# Patient Record
Sex: FEMALE | Race: WHITE | HISPANIC OR LATINO | Employment: UNEMPLOYED | URBAN - METROPOLITAN AREA
[De-identification: names, ages, dates, MRNs, and addresses within clinical notes are randomized per-mention and may not be internally consistent; named-entity substitution may affect disease eponyms.]

---

## 2018-06-29 DIAGNOSIS — K80.20 GALLBLADDER CALCULUS WITHOUT CHOLECYSTITIS AND NO OBSTRUCTION: Primary | ICD-10-CM

## 2018-08-16 ENCOUNTER — OFFICE VISIT (OUTPATIENT)
Dept: SURGERY | Facility: CLINIC | Age: 48
End: 2018-08-16
Payer: MEDICAID

## 2018-08-16 VITALS
DIASTOLIC BLOOD PRESSURE: 78 MMHG | BODY MASS INDEX: 33.65 KG/M2 | OXYGEN SATURATION: 98 % | WEIGHT: 182.88 LBS | HEIGHT: 62 IN | SYSTOLIC BLOOD PRESSURE: 102 MMHG | HEART RATE: 77 BPM | RESPIRATION RATE: 16 BRPM

## 2018-08-16 DIAGNOSIS — E78.00 HYPERCHOLESTEREMIA: ICD-10-CM

## 2018-08-16 DIAGNOSIS — Z78.9 USES SPANISH AS PRIMARY SPOKEN LANGUAGE: ICD-10-CM

## 2018-08-16 DIAGNOSIS — E66.9 OBESITY (BMI 30.0-34.9): ICD-10-CM

## 2018-08-16 DIAGNOSIS — K80.50 BILIARY COLIC: Primary | ICD-10-CM

## 2018-08-16 PROCEDURE — 99999 PR PBB SHADOW E&M-EST. PATIENT-LVL III: CPT | Mod: PBBFAC,,, | Performed by: SURGERY

## 2018-08-16 PROCEDURE — 99213 OFFICE O/P EST LOW 20 MIN: CPT | Mod: PBBFAC,PN | Performed by: SURGERY

## 2018-08-16 PROCEDURE — 99204 OFFICE O/P NEW MOD 45 MIN: CPT | Mod: S$PBB,,, | Performed by: SURGERY

## 2018-08-16 RX ORDER — LEVOTHYROXINE SODIUM 50 UG/1
TABLET ORAL
COMMUNITY
Start: 2018-08-04

## 2018-08-16 RX ORDER — ATORVASTATIN CALCIUM 20 MG/1
TABLET, FILM COATED ORAL
COMMUNITY
Start: 2018-08-04

## 2018-08-16 NOTE — LETTER
August 19, 2018      Davide Del Rosario NP  [address]  [address]  [facility] [address]           [place] - General Surgery  [address]  [facility] [address]  Phone: [phone]  Fax: [phone]          Patient: Bharti Fofana   MR Number: [id number]   YOB: 1970   Date of Visit: 8/16/2018       Dear Davide Del Rosario:    Thank you for referring Bharti Fofana to me for evaluation. Attached you will find relevant portions of my assessment and plan of care.    If you have questions, please do not hesitate to call me. I look forward to following Bharti Fofana along with you.    Sincerely,    Estela Montero,     Enclosure  CC:  No Recipients    If you would like to receive this communication electronically, please contact [email or web address] or [phone] to request more information on [facility] Link access.    For providers and/or their staff who would like to refer a patient to [facility], please contact us through our one-stop-shop provider referral line, [facility], at [phone].    If you feel you have received this communication in error or would no longer like to receive these types of communications, please e-mail [email or web address]

## 2018-08-16 NOTE — PROGRESS NOTES
"Subjective:      Patient ID: Bharti Fofana is a 48 y.o. female.    Chief Complaint: Gall Bladder Problem  Greenlandic speaking female p/w signif other for eval of RUQ pain, intermittent for 3yr+, usually occurs after fatty food/meat/coffee. Since she is avoiding those things, she feels better. No n/v. No f/c. 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 No other c/o.    Past Medical History:   Diagnosis Date    Hyperlipidemia     Hypothyroidism      Past Surgical History:   Procedure Laterality Date    HYSTERECTOMY       History reviewed. No pertinent family history.  Social History     Socioeconomic History    Marital status:      Spouse name: None    Number of children: None    Years of education: None    Highest education level: None   Social Needs    Financial resource strain: None    Food insecurity - worry: None    Food insecurity - inability: None    Transportation needs - medical: None    Transportation needs - non-medical: None   Occupational History    None   Tobacco Use    Smoking status: Never Smoker    Smokeless tobacco: Never Used   Substance and Sexual Activity    Alcohol use: No     Frequency: Never    Drug use: No    Sexual activity: None   Other Topics Concern    None   Social History Narrative    None       Current Outpatient Medications   Medication Sig Dispense Refill    atorvastatin (LIPITOR) 20 MG tablet       levothyroxine (SYNTHROID) 50 MCG tablet        No current facility-administered medications for this visit.      Review of patient's allergies indicates:  No Known Allergies    ROS:  All systems were reviewed and are negative, except that mentioned in the HPI.    Objective:     Vitals:    08/16/18 1407   BP: 102/78   BP Location: Left arm   Patient Position: Sitting   BP Method: Medium (Manual)   Pulse: 77   Resp: 16   SpO2: 98%   Weight: 83 kg (182 lb 14.4 oz)   Height: 5' 2" (1.575 m)     Physical Exam   Constitutional: She is oriented to person, " "place, and time. She appears well-developed and well-nourished. No distress.   HENT:   Head: Normocephalic and atraumatic.   Eyes: EOM are normal. Pupils are equal, round, and reactive to light. No scleral icterus.   Neck: Normal range of motion. No JVD present.   Cardiovascular: Normal rate and regular rhythm.   Pulmonary/Chest: Effort normal and breath sounds normal. No respiratory distress.   Abdominal: Soft. Bowel sounds are normal. She exhibits no distension. There is no rebound and no guarding.   Musculoskeletal: Normal range of motion.   Neurological: She is alert and oriented to person, place, and time. No cranial nerve deficit.   Skin: Skin is warm and dry. She is not diaphoretic.   Psychiatric: She has a normal mood and affect.       Lab Review:   Outside labs from 6/28/18 scanned into media file.  HCV neg  HBV neg  HIV neg  Vit D low  Hgb A1c - 5.2  Amylase 51  Lipase 21  TFTs within normal  Tot chol 198  CMP normal  CBC normal    Diagnostics Review:  DIS U/S report 7/19/18 states:  Liver unremarkable, 1.4cm mobile gallstone, "some gallbladder wall thickening," no biliary dilatation, normal CBD    Assessment:     1. Biliary colic    2. Obesity (BMI 30.0-34.9)    3. Hypercholesteremia    4. Uses English as primary spoken language      Plan:   The pathology of the patient's disease was discussed: biliary colic. Written handouts were explained and given to the patient.  Elective lap roman was recommended. The surgical procedure, risks, postop recovery and consent were discussed. The patient prefers to continue low fat diet for now and f/u in October. Signs and symptoms of worsening disease was discussed.  The patient will call or go to ER should those symptoms develop.  "

## 2018-08-27 ENCOUNTER — OFFICE VISIT (OUTPATIENT)
Dept: OBSTETRICS AND GYNECOLOGY | Facility: CLINIC | Age: 48
End: 2018-08-27
Payer: MEDICAID

## 2018-08-27 ENCOUNTER — LAB VISIT (OUTPATIENT)
Dept: LAB | Facility: HOSPITAL | Age: 48
End: 2018-08-27
Attending: OBSTETRICS & GYNECOLOGY
Payer: MEDICAID

## 2018-08-27 VITALS
DIASTOLIC BLOOD PRESSURE: 78 MMHG | WEIGHT: 182.56 LBS | BODY MASS INDEX: 33.6 KG/M2 | HEART RATE: 72 BPM | SYSTOLIC BLOOD PRESSURE: 114 MMHG | HEIGHT: 62 IN

## 2018-08-27 DIAGNOSIS — Z01.419 ENCOUNTER FOR ANNUAL ROUTINE GYNECOLOGICAL EXAMINATION: Primary | ICD-10-CM

## 2018-08-27 DIAGNOSIS — Z12.4 PAP SMEAR FOR CERVICAL CANCER SCREENING: ICD-10-CM

## 2018-08-27 DIAGNOSIS — N93.9 ABNORMAL UTERINE BLEEDING (AUB): ICD-10-CM

## 2018-08-27 LAB
BASOPHILS # BLD AUTO: 0.04 K/UL
BASOPHILS NFR BLD: 0.7 %
DIFFERENTIAL METHOD: ABNORMAL
EOSINOPHIL # BLD AUTO: 0.3 K/UL
EOSINOPHIL NFR BLD: 5.8 %
ERYTHROCYTE [DISTWIDTH] IN BLOOD BY AUTOMATED COUNT: 13.9 %
HCT VFR BLD AUTO: 39.2 %
HGB BLD-MCNC: 12.5 G/DL
LYMPHOCYTES # BLD AUTO: 2 K/UL
LYMPHOCYTES NFR BLD: 36.8 %
MCH RBC QN AUTO: 25.6 PG
MCHC RBC AUTO-ENTMCNC: 31.9 G/DL
MCV RBC AUTO: 80 FL
MONOCYTES # BLD AUTO: 0.6 K/UL
MONOCYTES NFR BLD: 10 %
NEUTROPHILS # BLD AUTO: 2.6 K/UL
NEUTROPHILS NFR BLD: 46.5 %
PLATELET # BLD AUTO: 269 K/UL
PMV BLD AUTO: 10.9 FL
PROLACTIN SERPL IA-MCNC: 22 NG/ML
RBC # BLD AUTO: 4.89 M/UL
TSH SERPL DL<=0.005 MIU/L-ACNC: 3.79 UIU/ML
WBC # BLD AUTO: 5.51 K/UL

## 2018-08-27 PROCEDURE — 99213 OFFICE O/P EST LOW 20 MIN: CPT | Mod: PBBFAC,PO | Performed by: OBSTETRICS & GYNECOLOGY

## 2018-08-27 PROCEDURE — 99386 PREV VISIT NEW AGE 40-64: CPT | Mod: S$PBB,,, | Performed by: OBSTETRICS & GYNECOLOGY

## 2018-08-27 PROCEDURE — 85025 COMPLETE CBC W/AUTO DIFF WBC: CPT

## 2018-08-27 PROCEDURE — 36415 COLL VENOUS BLD VENIPUNCTURE: CPT

## 2018-08-27 PROCEDURE — 84443 ASSAY THYROID STIM HORMONE: CPT

## 2018-08-27 PROCEDURE — 99999 PR PBB SHADOW E&M-EST. PATIENT-LVL III: CPT | Mod: PBBFAC,,, | Performed by: OBSTETRICS & GYNECOLOGY

## 2018-08-27 PROCEDURE — 84146 ASSAY OF PROLACTIN: CPT

## 2018-08-27 PROCEDURE — 88175 CYTOPATH C/V AUTO FLUID REDO: CPT

## 2018-08-27 PROCEDURE — 87624 HPV HI-RISK TYP POOLED RSLT: CPT

## 2018-08-27 NOTE — PROGRESS NOTES
Chief Complaint   Patient presents with    Metrorrhagia    Ovarian Cyst       HISTORY OF PRESENT ILLNESS:   Bharti Fofana is a 48 y.o. female  who presents for well woman exam.  Patient's last menstrual period was 2018.. She reports she had normal cycles until may when she had a 17 day cycle then had normal 8 days cycles but were painful and now she hasn't had one since .    Declines STD testing.      Past Medical History:   Diagnosis Date    Abnormal Pap smear of cervix     Hyperlipidemia     Hypothyroidism         Past Surgical History:   Procedure Laterality Date    TUBAL LIGATION         Social History     Socioeconomic History    Marital status:      Spouse name: Not on file    Number of children: Not on file    Years of education: Not on file    Highest education level: Not on file   Social Needs    Financial resource strain: Not on file    Food insecurity - worry: Not on file    Food insecurity - inability: Not on file    Transportation needs - medical: Not on file    Transportation needs - non-medical: Not on file   Occupational History    Not on file   Tobacco Use    Smoking status: Never Smoker    Smokeless tobacco: Never Used   Substance and Sexual Activity    Alcohol use: No     Frequency: Never    Drug use: No    Sexual activity: Not on file   Other Topics Concern    Not on file   Social History Narrative    Not on file       History reviewed. No pertinent family history.    OB History    Para Term  AB Living   5 4 4   1 4   SAB TAB Ectopic Multiple Live Births           4      # Outcome Date GA Lbr Isidro/2nd Weight Sex Delivery Anes PTL Lv   5 Term 08/10/00    M Vag-Spont   ROSEMARY   4 Term 99    M Vag-Spont   ROSEMARY   3 Term 93    F Vag-Spont   ROSEMARY   2 Term 91    M Vag-Spont   ROSEMARY   1 AB                   COMPREHENSIVE GYN HISTORY:  PAP History: had abn pap with colpo about 17 years ago but has been normal since  Infection History:  "Denies STDs. Denies PID.  Benign History: Denies uterine fibroids. had ovarian cyst but unsure which side. Denies endometriosis Denies other conditions.  Cancer History: Denies cervical cancer. Denies uterine cancer or hyperplasia. Denies ovarian cancer. Denies vulvar cancer or pre-cancer. Denies vaginal cancer or pre-cancer. Denies breast cancer. Denies colon cancer.  Cycle: 12/mon/8d    ROS:  GENERAL: Denies weight gain or weight loss. Feeling well overall.   SKIN: Denies rash or lesions.   HEAD: Denies headache.   NODES: Denies enlarged lymph nodes.   CHEST: Denies shortness of breath.   ABDOMEN: No abdominal pain, constipation, diarrhea, nausea, vomiting or rectal bleeding.   URINARY: No frequency, dysuria, hematuria, or burning on urination.  REPRODUCTIVE: See HPI.   BREASTS: The patient denies pain, lumps, or nipple discharge.       /78   Pulse 72   Ht 5' 2" (1.575 m)   Wt 82.8 kg (182 lb 8.7 oz)   LMP 07/01/2018   BMI 33.39 kg/m²     APPEARANCE: Well nourished, well developed, in no acute distress.  NECK: Neck symmetric without  thyromegaly.  NODES: No inguinal, cervical lymph node enlargement.  CHEST: Lungs clear to auscultation.  HEART: Regular rate and rhythm, no murmurs, rubs or gallops.  ABDOMEN: Soft. No tenderness or masses. No hernias. No hepatosplenomegaly.  BREASTS: Symmetrical, no skin changes or visible lesions. No palpable masses, nipple discharge or adenopathy bilaterally.  PELVIC:   VULVA: No lesions. Normal female genitalia.  URETHRAL MEATUS: Normal size and location, no lesions, no prolapse.  URETHRA: No masses, tenderness, prolapse or scarring.  VAGINA: Moist and well rugated, no discharge, no significant cystocele or rectocele.  CERVIX: No lesions and discharge.  UTERUS: mildly enlarged size, regular shape, mobile, non-tender, bladder base nontender.  ADNEXA: fullness on right side, no tenderness.  PERINEUM: Normal, mo masses        1. Abnormal uterine bleeding (AUB)        A/P " 1. Routine gyn annual exam. s/p normal breast exam and MMG ordered.  Pap with HPV cotesting ordered. STD testing: GC/CT/trich, syphilis, HBV/HCV and HIV declined.   2. AUB with get Cbc, TSH, prolactin, US and will do EMB afterwards    F/u in 1 yr or PRN

## 2018-08-27 NOTE — LETTER
August 27, 2018      Davide Del Rosario NP  [address]  [address]  [facility] [place] [address]           [place] - OB/GYN  [address], [address]  [address] [facility]  [place] [address]  Phone: [phone]          Patient: Bharti Fofana   MR Number: [id number]   YOB: 1970   Date of Visit: 8/27/2018       Dear Davide Del Rosario:    Thank you for referring Bharti Fofana to me for evaluation. Attached you will find relevant portions of my assessment and plan of care.    If you have questions, please do not hesitate to call me. I look forward to following Bharti Fofana along with you.    Sincerely,    Cailin Omalley MD    Enclosure  CC:  No Recipients    If you would like to receive this communication electronically, please contact [email or web address] or [phone] to request more information on [email or web address] Link access.    For providers and/or their staff who would like to refer a patient to [facility], please contact us through our one-stop-shop provider referral line, [facility] , at [phone].    If you feel you have received this communication in error or would no longer like to receive these types of communications, please e-mail [email or web address]

## 2018-09-05 ENCOUNTER — TELEPHONE (OUTPATIENT)
Dept: OBSTETRICS AND GYNECOLOGY | Facility: CLINIC | Age: 48
End: 2018-09-05

## 2018-09-05 LAB
HPV HR 12 DNA CVX QL NAA+PROBE: NEGATIVE
HPV16 AG SPEC QL: NEGATIVE
HPV18 DNA SPEC QL NAA+PROBE: NEGATIVE

## 2018-09-05 NOTE — PROGRESS NOTES
Please send patient pap smear letter or call. Her pap was negative and negative for the HPV virus so since it has been many  years since she had abnormal pap smear the new recommendations are that she will not need another one for 5 years but we will still continue to see her for annuals. thanks.

## 2018-09-05 NOTE — TELEPHONE ENCOUNTER
Called and informed patient of results. Patient agreed and verbalized understanding.    Cailin Omalley MD sent to [facility] Staff             Please send patient pap smear letter or call. Her pap was negative and negative for the HPV virus so since it has been many  years since she had abnormal pap smear the new recommendations are that she will not need another one for 5 years but we will still continue to see her for annuals. thanks.

## 2018-12-25 ENCOUNTER — HOSPITAL ENCOUNTER (OUTPATIENT)
Facility: HOSPITAL | Age: 48
LOS: 1 days | Discharge: HOME | End: 2018-12-26
Attending: STUDENT IN AN ORGANIZED HEALTH CARE EDUCATION/TRAINING PROGRAM | Admitting: STUDENT IN AN ORGANIZED HEALTH CARE EDUCATION/TRAINING PROGRAM
Payer: MEDICAID

## 2018-12-25 DIAGNOSIS — K80.50 BILIARY COLIC: ICD-10-CM

## 2018-12-25 DIAGNOSIS — B37.31 VAGINAL CANDIDIASIS: ICD-10-CM

## 2018-12-25 DIAGNOSIS — K82.1 ACUTE HYDROPS OF GALLBLADDER: Primary | ICD-10-CM

## 2018-12-25 DIAGNOSIS — R10.11 RIGHT UPPER QUADRANT ABDOMINAL PAIN: ICD-10-CM

## 2018-12-25 DIAGNOSIS — K80.50 BILIARY COLIC SYMPTOM: ICD-10-CM

## 2018-12-25 DIAGNOSIS — Z01.818 PRE-OP EXAM: ICD-10-CM

## 2018-12-25 PROBLEM — E78.5 HYPERLIPIDEMIA: Status: ACTIVE | Noted: 2018-12-25

## 2018-12-25 PROBLEM — E03.9 HYPOTHYROIDISM: Status: ACTIVE | Noted: 2018-12-25

## 2018-12-25 PROCEDURE — 93005 ELECTROCARDIOGRAM TRACING: CPT

## 2018-12-25 PROCEDURE — 93010 ELECTROCARDIOGRAM REPORT: CPT | Mod: ,,, | Performed by: STUDENT IN AN ORGANIZED HEALTH CARE EDUCATION/TRAINING PROGRAM

## 2018-12-25 PROCEDURE — 99219 PR INITIAL OBSERVATION CARE,LEVL II: ICD-10-PCS | Mod: ,,, | Performed by: STUDENT IN AN ORGANIZED HEALTH CARE EDUCATION/TRAINING PROGRAM

## 2018-12-25 PROCEDURE — G0378 HOSPITAL OBSERVATION PER HR: HCPCS

## 2018-12-25 PROCEDURE — 93010 EKG 12-LEAD: ICD-10-PCS | Mod: ,,, | Performed by: STUDENT IN AN ORGANIZED HEALTH CARE EDUCATION/TRAINING PROGRAM

## 2018-12-25 PROCEDURE — 99219 PR INITIAL OBSERVATION CARE,LEVL II: CPT | Mod: ,,, | Performed by: STUDENT IN AN ORGANIZED HEALTH CARE EDUCATION/TRAINING PROGRAM

## 2018-12-25 PROCEDURE — 25000003 PHARM REV CODE 250: Performed by: STUDENT IN AN ORGANIZED HEALTH CARE EDUCATION/TRAINING PROGRAM

## 2018-12-25 RX ORDER — ONDANSETRON 8 MG/1
8 TABLET, ORALLY DISINTEGRATING ORAL EVERY 8 HOURS PRN
Status: DISCONTINUED | OUTPATIENT
Start: 2018-12-25 | End: 2018-12-26 | Stop reason: HOSPADM

## 2018-12-25 RX ORDER — LEVOTHYROXINE SODIUM 50 UG/1
50 TABLET ORAL
Status: DISCONTINUED | OUTPATIENT
Start: 2018-12-26 | End: 2018-12-26 | Stop reason: HOSPADM

## 2018-12-25 RX ORDER — HYDROCODONE BITARTRATE AND ACETAMINOPHEN 5; 325 MG/1; MG/1
1 TABLET ORAL EVERY 4 HOURS PRN
Status: DISCONTINUED | OUTPATIENT
Start: 2018-12-25 | End: 2018-12-26 | Stop reason: HOSPADM

## 2018-12-25 RX ORDER — RAMELTEON 8 MG/1
8 TABLET ORAL NIGHTLY PRN
Status: DISCONTINUED | OUTPATIENT
Start: 2018-12-25 | End: 2018-12-26 | Stop reason: HOSPADM

## 2018-12-25 RX ORDER — MICONAZOLE NITRATE 2 %
1 CREAM WITH APPLICATOR VAGINAL NIGHTLY
Status: DISCONTINUED | OUTPATIENT
Start: 2018-12-25 | End: 2018-12-26 | Stop reason: HOSPADM

## 2018-12-25 RX ORDER — LIDOCAINE HYDROCHLORIDE 10 MG/ML
1 INJECTION, SOLUTION EPIDURAL; INFILTRATION; INTRACAUDAL; PERINEURAL ONCE
Status: DISCONTINUED | OUTPATIENT
Start: 2018-12-25 | End: 2018-12-26 | Stop reason: HOSPADM

## 2018-12-25 RX ORDER — SODIUM CHLORIDE 0.9 % (FLUSH) 0.9 %
3 SYRINGE (ML) INJECTION
Status: DISCONTINUED | OUTPATIENT
Start: 2018-12-25 | End: 2018-12-26 | Stop reason: HOSPADM

## 2018-12-25 RX ORDER — ACETAMINOPHEN 325 MG/1
650 TABLET ORAL EVERY 8 HOURS PRN
Status: DISCONTINUED | OUTPATIENT
Start: 2018-12-25 | End: 2018-12-26 | Stop reason: HOSPADM

## 2018-12-25 RX ORDER — ATORVASTATIN CALCIUM 20 MG/1
20 TABLET, FILM COATED ORAL DAILY
Status: DISCONTINUED | OUTPATIENT
Start: 2018-12-25 | End: 2018-12-26 | Stop reason: HOSPADM

## 2018-12-25 RX ADMIN — ATORVASTATIN CALCIUM 20 MG: 20 TABLET, FILM COATED ORAL at 03:12

## 2018-12-25 RX ADMIN — MICONAZOLE NITRATE 1 APPLICATOR: 20 CREAM VAGINAL at 09:12

## 2018-12-25 NOTE — NURSING
Dr. Black at bedside. All questions answered. I translated for patient as she is Uzbek speaking only. Time for all questions allowed. Patient verbalizes understanding of clear liquid diet now til midnight tonight; npo after midnight for possible chlolecystectomy tomorrow. Patient expresses desire to have gallbladder out as her pain has been worst since she was last seen by surgeon and  her mother having   a    history of gallbladder cancer.

## 2018-12-25 NOTE — H&P
Patient ID: Bharti Fofana is a 48 y.o. female.    Chief Complaint: No chief complaint on file.      HPI:  HPI   48F with RUQ pain that began yestrday, became severe. Pain has been constant so she presented to [facility]. [facility]. She has had multiple pain episodes like this before. Pain radiates to her back. She has seen dr graves a few months ago but did not want surgery as pain was not that bad. Pain seems to be worsening. Has known gallstone on US. Pain is worse after eating. Also complains of vaginal itching.    Review of Systems   Constitutional: Negative for fever.   HENT: Negative for trouble swallowing.    Respiratory: Negative for shortness of breath.    Cardiovascular: Negative for chest pain.   Gastrointestinal: Positive for abdominal pain. Negative for blood in stool, nausea and vomiting.   Genitourinary: Negative for dysuria.   Musculoskeletal: Negative for gait problem.   Skin: Negative for color change, rash and wound.   Allergic/Immunologic: Negative for immunocompromised state.   Neurological: Negative for weakness.   Hematological: Does not bruise/bleed easily.   Psychiatric/Behavioral: Negative for agitation.       Current Facility-Administered Medications   Medication Dose Route Frequency Provider Last Rate Last Dose    acetaminophen tablet 650 mg  650 mg Oral Q8H PRN Justin Black MD        atorvastatin tablet 20 mg  20 mg Oral Daily Justin Black MD        HYDROcodone-acetaminophen 5-325 mg per tablet 1 tablet  1 tablet Oral Q4H PRN Justin Black MD        [START ON 12/26/2018] levothyroxine tablet 50 mcg  50 mcg Oral Before breakfast Justin Black MD        lidocaine (PF) 10 mg/ml (1%) injection 10 mg  1 mL Other Once Justin Black MD        ondansetron disintegrating tablet 8 mg  8 mg Oral Q8H PRN Justin Black MD        promethazine (PHENERGAN) 6.25 mg in dextrose 5 % 50 mL IVPB  6.25 mg Intravenous Q6H PRN Justin Black MD         ramelteon tablet 8 mg  8 mg Oral Nightly PRN Justin Black MD        sodium chloride 0.9% flush 3 mL  3 mL Intravenous PRN Justin Black MD           Review of patient's allergies indicates:  No Known Allergies    Past Medical History:   Diagnosis Date    Abnormal Pap smear of cervix     Hyperlipidemia     Hypothyroidism        Past Surgical History:   Procedure Laterality Date    TUBAL LIGATION         No family history on file.    Social History     Socioeconomic History    Marital status:      Spouse name: Not on file    Number of children: Not on file    Years of education: Not on file    Highest education level: Not on file   Social Needs    Financial resource strain: Not on file    Food insecurity - worry: Not on file    Food insecurity - inability: Not on file    Transportation needs - medical: Not on file    Transportation needs - non-medical: Not on file   Occupational History    Not on file   Tobacco Use    Smoking status: Never Smoker    Smokeless tobacco: Never Used   Substance and Sexual Activity    Alcohol use: No     Frequency: Never    Drug use: No    Sexual activity: Not on file   Other Topics Concern    Not on file   Social History Narrative    Not on file       Vitals:    12/25/18 1403   BP: (!) 142/73   Pulse: 72   Resp: 18   Temp: 98.5 °F (36.9 °C)       Physical Exam   Constitutional: She is oriented to person, place, and time. She appears well-developed and well-nourished. No distress.   HENT:   Head: Normocephalic and atraumatic.   Eyes: No scleral icterus.   Cardiovascular: Normal rate.   Pulmonary/Chest: Effort normal. No stridor.   Abdominal: Soft. She exhibits no distension. There is no tenderness.   Lymphadenopathy:     She has no cervical adenopathy.   Neurological: She is alert and oriented to person, place, and time.   Skin: Skin is warm. No erythema.   Psychiatric: She has a normal mood and affect. Her behavior is normal.     LFTs  normal  WBC normal  US shows 1cm nonmobile gallstone in neck of gb. CBD 2mm      Assessment & Plan:   48F with biliary colic  TO OR for lap roman. Will try to go to surgery tomorrow  Clears ok for now. NPO after midnight  Hypothyroid - resume synthroid  HLD - resume statin  miconazole

## 2018-12-26 ENCOUNTER — ANESTHESIA (OUTPATIENT)
Dept: SURGERY | Facility: HOSPITAL | Age: 48
End: 2018-12-26
Payer: MEDICAID

## 2018-12-26 ENCOUNTER — ANESTHESIA EVENT (OUTPATIENT)
Dept: SURGERY | Facility: HOSPITAL | Age: 48
End: 2018-12-26
Payer: MEDICAID

## 2018-12-26 VITALS
TEMPERATURE: 98 F | SYSTOLIC BLOOD PRESSURE: 131 MMHG | OXYGEN SATURATION: 100 % | DIASTOLIC BLOOD PRESSURE: 72 MMHG | HEIGHT: 62 IN | RESPIRATION RATE: 20 BRPM | BODY MASS INDEX: 31.85 KG/M2 | HEART RATE: 88 BPM | WEIGHT: 173.06 LBS

## 2018-12-26 PROBLEM — K82.1 ACUTE HYDROPS OF GALLBLADDER: Status: ACTIVE | Noted: 2018-12-25

## 2018-12-26 PROCEDURE — 27201423 OPTIME MED/SURG SUP & DEVICES STERILE SUPPLY: Performed by: SURGERY

## 2018-12-26 PROCEDURE — 94761 N-INVAS EAR/PLS OXIMETRY MLT: CPT | Mod: 59

## 2018-12-26 PROCEDURE — 25000003 PHARM REV CODE 250: Performed by: ANESTHESIOLOGY

## 2018-12-26 PROCEDURE — 00790 ANES IPER UPR ABD NOS: CPT | Performed by: SURGERY

## 2018-12-26 PROCEDURE — 88304 TISSUE EXAM BY PATHOLOGIST: CPT | Performed by: PATHOLOGY

## 2018-12-26 PROCEDURE — 25000003 PHARM REV CODE 250: Performed by: SURGERY

## 2018-12-26 PROCEDURE — 25000003 PHARM REV CODE 250: Performed by: NURSE ANESTHETIST, CERTIFIED REGISTERED

## 2018-12-26 PROCEDURE — 47562 LAPAROSCOPIC CHOLECYSTECTOMY: CPT | Mod: ,,, | Performed by: SURGERY

## 2018-12-26 PROCEDURE — 36000708 HC OR TIME LEV III 1ST 15 MIN: Performed by: SURGERY

## 2018-12-26 PROCEDURE — 36000709 HC OR TIME LEV III EA ADD 15 MIN: Performed by: SURGERY

## 2018-12-26 PROCEDURE — 88304 TISSUE SPECIMEN TO PATHOLOGY - SURGERY: ICD-10-PCS | Mod: 26,,, | Performed by: PATHOLOGY

## 2018-12-26 PROCEDURE — 88304 TISSUE EXAM BY PATHOLOGIST: CPT | Mod: 26,,, | Performed by: PATHOLOGY

## 2018-12-26 PROCEDURE — 63600175 PHARM REV CODE 636 W HCPCS: Performed by: NURSE ANESTHETIST, CERTIFIED REGISTERED

## 2018-12-26 PROCEDURE — S0020 INJECTION, BUPIVICAINE HYDRO: HCPCS | Performed by: SURGERY

## 2018-12-26 PROCEDURE — 25000003 PHARM REV CODE 250: Performed by: STUDENT IN AN ORGANIZED HEALTH CARE EDUCATION/TRAINING PROGRAM

## 2018-12-26 PROCEDURE — 37000009 HC ANESTHESIA EA ADD 15 MINS: Performed by: SURGERY

## 2018-12-26 PROCEDURE — 71000033 HC RECOVERY, INTIAL HOUR: Performed by: SURGERY

## 2018-12-26 PROCEDURE — A4216 STERILE WATER/SALINE, 10 ML: HCPCS | Performed by: ANESTHESIOLOGY

## 2018-12-26 PROCEDURE — G0378 HOSPITAL OBSERVATION PER HR: HCPCS

## 2018-12-26 PROCEDURE — 47562 PR LAP,CHOLECYSTECTOMY: ICD-10-PCS | Mod: ,,, | Performed by: SURGERY

## 2018-12-26 PROCEDURE — 37000008 HC ANESTHESIA 1ST 15 MINUTES: Performed by: SURGERY

## 2018-12-26 RX ORDER — DEXAMETHASONE SODIUM PHOSPHATE 4 MG/ML
INJECTION, SOLUTION INTRA-ARTICULAR; INTRALESIONAL; INTRAMUSCULAR; INTRAVENOUS; SOFT TISSUE
Status: DISCONTINUED | OUTPATIENT
Start: 2018-12-26 | End: 2018-12-26

## 2018-12-26 RX ORDER — PROPOFOL 10 MG/ML
VIAL (ML) INTRAVENOUS
Status: DISCONTINUED | OUTPATIENT
Start: 2018-12-26 | End: 2018-12-26

## 2018-12-26 RX ORDER — ONDANSETRON HYDROCHLORIDE 2 MG/ML
INJECTION, SOLUTION INTRAMUSCULAR; INTRAVENOUS
Status: DISCONTINUED | OUTPATIENT
Start: 2018-12-26 | End: 2018-12-26

## 2018-12-26 RX ORDER — KETOROLAC TROMETHAMINE 30 MG/ML
INJECTION, SOLUTION INTRAMUSCULAR; INTRAVENOUS
Status: DISCONTINUED | OUTPATIENT
Start: 2018-12-26 | End: 2018-12-26

## 2018-12-26 RX ORDER — DIPHENHYDRAMINE HYDROCHLORIDE 50 MG/ML
25 INJECTION INTRAMUSCULAR; INTRAVENOUS EVERY 6 HOURS PRN
Status: DISCONTINUED | OUTPATIENT
Start: 2018-12-26 | End: 2018-12-26 | Stop reason: HOSPADM

## 2018-12-26 RX ORDER — PHENYLEPHRINE HYDROCHLORIDE 10 MG/ML
INJECTION INTRAVENOUS
Status: DISCONTINUED | OUTPATIENT
Start: 2018-12-26 | End: 2018-12-26

## 2018-12-26 RX ORDER — ROCURONIUM BROMIDE 10 MG/ML
INJECTION, SOLUTION INTRAVENOUS
Status: DISCONTINUED | OUTPATIENT
Start: 2018-12-26 | End: 2018-12-26

## 2018-12-26 RX ORDER — MIDAZOLAM HYDROCHLORIDE 1 MG/ML
INJECTION INTRAMUSCULAR; INTRAVENOUS
Status: DISCONTINUED | OUTPATIENT
Start: 2018-12-26 | End: 2018-12-26

## 2018-12-26 RX ORDER — SODIUM CHLORIDE 0.9 % (FLUSH) 0.9 %
3 SYRINGE (ML) INJECTION
Status: DISCONTINUED | OUTPATIENT
Start: 2018-12-26 | End: 2018-12-26 | Stop reason: HOSPADM

## 2018-12-26 RX ORDER — NEOSTIGMINE METHYLSULFATE 1 MG/ML
INJECTION, SOLUTION INTRAVENOUS
Status: DISCONTINUED | OUTPATIENT
Start: 2018-12-26 | End: 2018-12-26

## 2018-12-26 RX ORDER — ONDANSETRON 2 MG/ML
4 INJECTION INTRAMUSCULAR; INTRAVENOUS DAILY PRN
Status: DISCONTINUED | OUTPATIENT
Start: 2018-12-26 | End: 2018-12-26 | Stop reason: HOSPADM

## 2018-12-26 RX ORDER — GLYCOPYRROLATE 0.2 MG/ML
INJECTION INTRAMUSCULAR; INTRAVENOUS
Status: DISCONTINUED | OUTPATIENT
Start: 2018-12-26 | End: 2018-12-26

## 2018-12-26 RX ORDER — FENTANYL CITRATE 50 UG/ML
INJECTION, SOLUTION INTRAMUSCULAR; INTRAVENOUS
Status: DISCONTINUED | OUTPATIENT
Start: 2018-12-26 | End: 2018-12-26

## 2018-12-26 RX ORDER — HYDROMORPHONE HYDROCHLORIDE 2 MG/ML
0.2 INJECTION, SOLUTION INTRAMUSCULAR; INTRAVENOUS; SUBCUTANEOUS EVERY 5 MIN PRN
Status: ACTIVE | OUTPATIENT
Start: 2018-12-26 | End: 2018-12-26

## 2018-12-26 RX ORDER — HYDROMORPHONE HYDROCHLORIDE 2 MG/ML
0.5 INJECTION, SOLUTION INTRAMUSCULAR; INTRAVENOUS; SUBCUTANEOUS EVERY 5 MIN PRN
Status: ACTIVE | OUTPATIENT
Start: 2018-12-26 | End: 2018-12-26

## 2018-12-26 RX ORDER — CEFAZOLIN SODIUM 1 G/3ML
INJECTION, POWDER, FOR SOLUTION INTRAMUSCULAR; INTRAVENOUS
Status: DISCONTINUED | OUTPATIENT
Start: 2018-12-26 | End: 2018-12-26

## 2018-12-26 RX ORDER — BUPIVACAINE HYDROCHLORIDE 5 MG/ML
INJECTION, SOLUTION EPIDURAL; INTRACAUDAL
Status: DISCONTINUED | OUTPATIENT
Start: 2018-12-26 | End: 2018-12-26 | Stop reason: HOSPADM

## 2018-12-26 RX ORDER — SODIUM CHLORIDE, SODIUM LACTATE, POTASSIUM CHLORIDE, CALCIUM CHLORIDE 600; 310; 30; 20 MG/100ML; MG/100ML; MG/100ML; MG/100ML
INJECTION, SOLUTION INTRAVENOUS CONTINUOUS PRN
Status: DISCONTINUED | OUTPATIENT
Start: 2018-12-26 | End: 2018-12-26

## 2018-12-26 RX ORDER — MICONAZOLE NITRATE 2 %
1 CREAM WITH APPLICATOR VAGINAL NIGHTLY
Refills: 0 | COMMUNITY
Start: 2018-12-26 | End: 2019-01-03

## 2018-12-26 RX ORDER — SODIUM CHLORIDE 0.9 % (FLUSH) 0.9 %
3 SYRINGE (ML) INJECTION EVERY 8 HOURS
Status: DISCONTINUED | OUTPATIENT
Start: 2018-12-26 | End: 2018-12-26 | Stop reason: HOSPADM

## 2018-12-26 RX ORDER — LIDOCAINE HCL/PF 100 MG/5ML
SYRINGE (ML) INTRAVENOUS
Status: DISCONTINUED | OUTPATIENT
Start: 2018-12-26 | End: 2018-12-26

## 2018-12-26 RX ORDER — OXYCODONE AND ACETAMINOPHEN 5; 325 MG/1; MG/1
1 TABLET ORAL EVERY 6 HOURS PRN
Qty: 28 TABLET | Refills: 0 | Status: SHIPPED | OUTPATIENT
Start: 2018-12-26 | End: 2019-01-02

## 2018-12-26 RX ADMIN — DEXAMETHASONE SODIUM PHOSPHATE 8 MG: 4 INJECTION, SOLUTION INTRAMUSCULAR; INTRAVENOUS at 09:12

## 2018-12-26 RX ADMIN — LEVOTHYROXINE SODIUM 50 MCG: 50 TABLET ORAL at 06:12

## 2018-12-26 RX ADMIN — GLYCOPYRROLATE 0.4 MG: 0.2 INJECTION, SOLUTION INTRAMUSCULAR; INTRAVENOUS at 10:12

## 2018-12-26 RX ADMIN — NEOSTIGMINE METHYLSULFATE 2 MG: 1 INJECTION INTRAVENOUS at 10:12

## 2018-12-26 RX ADMIN — LIDOCAINE HYDROCHLORIDE 100 MG: 20 INJECTION, SOLUTION INTRAVENOUS at 09:12

## 2018-12-26 RX ADMIN — KETOROLAC TROMETHAMINE 30 MG: 30 INJECTION, SOLUTION INTRAMUSCULAR; INTRAVENOUS at 10:12

## 2018-12-26 RX ADMIN — SODIUM CHLORIDE, SODIUM LACTATE, POTASSIUM CHLORIDE, AND CALCIUM CHLORIDE: .6; .31; .03; .02 INJECTION, SOLUTION INTRAVENOUS at 10:12

## 2018-12-26 RX ADMIN — PHENYLEPHRINE HYDROCHLORIDE 150 MCG: 10 INJECTION INTRAVENOUS at 10:12

## 2018-12-26 RX ADMIN — MIDAZOLAM HYDROCHLORIDE 2 MG: 1 INJECTION, SOLUTION INTRAMUSCULAR; INTRAVENOUS at 09:12

## 2018-12-26 RX ADMIN — ROCURONIUM BROMIDE 40 MG: 10 INJECTION, SOLUTION INTRAVENOUS at 09:12

## 2018-12-26 RX ADMIN — PROPOFOL 150 MG: 10 INJECTION, EMULSION INTRAVENOUS at 09:12

## 2018-12-26 RX ADMIN — PHENYLEPHRINE HYDROCHLORIDE 150 MCG: 10 INJECTION INTRAVENOUS at 09:12

## 2018-12-26 RX ADMIN — SODIUM CHLORIDE, SODIUM LACTATE, POTASSIUM CHLORIDE, AND CALCIUM CHLORIDE: .6; .31; .03; .02 INJECTION, SOLUTION INTRAVENOUS at 09:12

## 2018-12-26 RX ADMIN — ONDANSETRON 8 MG: 2 INJECTION, SOLUTION INTRAMUSCULAR; INTRAVENOUS at 10:12

## 2018-12-26 RX ADMIN — FENTANYL CITRATE 150 MCG: 50 INJECTION, SOLUTION INTRAMUSCULAR; INTRAVENOUS at 09:12

## 2018-12-26 RX ADMIN — SODIUM CHLORIDE, PRESERVATIVE FREE 3 ML: 5 INJECTION INTRAVENOUS at 02:12

## 2018-12-26 RX ADMIN — CEFAZOLIN 2 G: 330 INJECTION, POWDER, FOR SOLUTION INTRAMUSCULAR; INTRAVENOUS at 09:12

## 2018-12-26 RX ADMIN — PHENYLEPHRINE HYDROCHLORIDE 100 MCG: 10 INJECTION INTRAVENOUS at 09:12

## 2018-12-26 RX ADMIN — ROCURONIUM BROMIDE 10 MG: 10 INJECTION, SOLUTION INTRAVENOUS at 10:12

## 2018-12-26 NOTE — PLAN OF CARE
Problem: Adult Inpatient Plan of Care  Goal: Plan of Care Review  Outcome: Ongoing (interventions implemented as appropriate)  Tolerating clear liquid diet  Aware for potential surgery in morning  Aware to be npo after midnight    Only reports mild epigastric discomfort to palpation  No nausea reported   Plan of care reviewed with pt and family

## 2018-12-26 NOTE — PLAN OF CARE
TN met with pt   This  put name on white board and explained blue discharge folder to patient. Discharge planning brochure and/or business card given to patient.  Patient verbalized understanding.  pt and  speak some English; son at bedside speaks English.    per pt and son - d/c instructions have been reviewed with pt in [place]       Future Appointments   Date Time Provider Department Center   1/8/2019  9:40 AM Kenneth Sahu Jr., MD [facility] [place]     d/c summ to be faxed to pt's pcp at [facility]     s/p lap cholecystectomy  12/26 12/26/18 0240   Discharge Assessment   Assessment Type Discharge Planning Assessment   Confirmed/corrected address and phone number on facesheet? Yes   Assessment information obtained from? Patient;Medical Record;Caregiver   Expected Length of Stay (days) 1   Communicated expected length of stay with patient/caregiver yes   Prior to hospitilization cognitive status: Alert/Oriented   Prior to hospitalization functional status: Independent   Current cognitive status: Alert/Oriented   Current Functional Status: Independent   Lives With child(julio), adult;spouse   Able to Return to Prior Arrangements yes   Is patient able to care for self after discharge? Yes   Who are your caregiver(s) and their phone number(s)? ( Oswaldo  [phone]; adult son, 15 yo son )   Patient's perception of discharge disposition home or selfcare   Readmission Within the Last 30 Days no previous admission in last 30 days   Patient currently being followed by outpatient case management? No   Patient currently receives any other outside agency services? No   Equipment Currently Used at Home none   Do you have any problems affording any of your prescribed medications? No  ([place] )   Is the patient taking medications as prescribed? yes   Does the patient have transportation home? Yes   Transportation Anticipated family or friend will provide   Does  the patient receive services at the [facility]? No   Discharge Plan A Home;Home with family   Patient/Family in Agreement with Plan yes

## 2018-12-26 NOTE — PLAN OF CARE
Problem: Adult Inpatient Plan of Care  Goal: Plan of Care Review  Outcome: Ongoing (interventions implemented as appropriate)  Pt vitals were maintained . Pt didn't complain of any pain all throughout the night, pt slept mostly. Pt maybe is schedule of surgery pending Dr orders. No other complaints. Will continue to monitor

## 2018-12-26 NOTE — PROGRESS NOTES
Follow-Up       Follow-up With  Details  Why  Contact Info   Kenneth Sahu Jr., MD  On 1/3/2019  2:20 pm   [address] [address]  [phone]

## 2018-12-26 NOTE — PROGRESS NOTES
Patient seen examined postoperatively.  She is in good spirits and indicates her pain is well controlled.  She is tolerating clear liquid diet.  Patient feels comfortable being discharged home today.  We reviewed postoperative instructions and all questions were answered.

## 2018-12-26 NOTE — TRANSFER OF CARE
"Anesthesia Transfer of Care Note    Patient: Bharti Fofana    Procedure(s) Performed: Procedure(s) (LRB):  CHOLECYSTECTOMY, LAPAROSCOPIC, WITH CHOLANGIOGRAM (N/A)    Patient location: PACU    Anesthesia Type: general    Transport from OR: Transported from OR on 6-10 L/min O2 by face mask with adequate spontaneous ventilation    Post pain: adequate analgesia    Post assessment: no apparent anesthetic complications    Post vital signs: stable    Level of consciousness: responds to stimulation and sedated    Nausea/Vomiting: no nausea/vomiting    Complications: none    Transfer of care protocol was followed      Last vitals:   Visit Vitals  /69 (Patient Position: Lying)   Pulse 75   Temp 36.7 °C (98.1 °F) (Oral)   Resp 20   Ht 5' 2" (1.575 m)   Wt 78.5 kg (173 lb 1 oz)   LMP 07/01/2018   SpO2 99%   Breastfeeding? No   BMI 31.65 kg/m²     "

## 2018-12-26 NOTE — PROGRESS NOTES
[facility]  PROGRESS NOTE    HPI: Bharti Fofana is a 48 y.o. female admitted for right upper quadrant pain with cholelithiasis.      VITALS:  Temp:  [97.7 °F (36.5 °C)-98.5 °F (36.9 °C)] 97.9 °F (36.6 °C)  Pulse:  [60-74] 74  Resp:  [18] 18  SpO2:  [99 %] 99 %  BP: (124-142)/(68-76) 124/68    PHYSICAL EXAM:  GEN:  No acute distress, alert and orient x3  HEENT:  Anicteric sclera  CV:  Regular rhythm  RESP:  Nonlabored breathing  ABD:  Mild tenderness palpation right upper quadrant, otherwise soft nontender  EXT:  No significant edema    LABS:  CBC:   Recent Labs   Lab 12/25/18  0531   WBC 6.65   RBC 4.71   HGB 12.0   HCT 37.6      MCV 80*   MCH 25.5*   MCHC 31.9*     CMP:   Recent Labs   Lab 12/25/18  0531   *   CALCIUM 8.6*   ALBUMIN 4.1   PROT 7.6      K 3.8   CO2 24      BUN 14   CREATININE 0.63   ALKPHOS 78   ALT 15   AST 16   BILITOT 0.3     Labs within the past 24 hours have been reviewed.    ASSESSMENT & PLAN:  48 y.o. female acute onset of right upper quadrant pain and gallstones seen on ultrasound  - plan for lap versus open cholecystectomy today  - consent obtained through the language line, patient queried for questions and all concerns addressed

## 2018-12-26 NOTE — OP NOTE
DATE OF PROCEDURE: 12/26/2018    PREOPERATIVE DIAGNOSIS:  Cholelithiasis    POSTOPERATIVE DIAGNOSIS:  Cholecystitis with hydrops gallbladder    PROCEDURE: Laparoscopic cholecystectomy    SURGEON: Kenneth Sahu M.D    ASSISTANT:  Hussein Arora MD (PGY II)    ANESTHESIA: General endotracheal    ESTIMATED BLOOD LOSS:  20 cc    SPECIMEN: Gallbladder    CONDITION: Stable    COMPLICATIONS: None    FINDINGS:   - gallbladder with moderate inflammation and adhesive disease  - drainage of gallbladder revealed clear fluid indicating hydrops  - stone impacted at infundibulum/duct junction    INDICATIONS: The patient is a 48 y.o. female who presented right upper quadrant pain suggestive of biliary etiology. The patient was counseled on their surgical options and desired surgical intervention. The risks of the procedure were described to the patient including pain, infection, bleeding, scarring, wound complications, injury to local structures such as bile duct, liver or intestine,warranting more extensive surgery, retained common bile duct stone or need for further intervention. The patient demonstrated understanding of these risks and a consent form was obtained.  The consent was obtained through translation service    PROCEDURE IN DETAIL: The patient was identified in the Preoperative Unit and taken back to the Operating Room and laid supine on the operating room table. IV antibiotics were administered and general anesthesia was induced without complication. The patient was then prepped and draped in the standard sterile fashion. A tmeout was performed in accordance with hospital protocol. After injecting local Marcaine 0.5%, a 1 cm incision was sharply made in the supraumbilical region. An Optiview trocar was used to gain access to the abdominal cavity and insufflation was initiated.  We achieved appropriate pneumoperitoneum.  There was some adhesive disease present at our entry site but no obvious injury to underlying  bowel.  The abdomen was then inspected. An 11 mm trocar was placed in the subxiphoid region after 1st injecting local anesthetic and then making a sharp incision through the skin. Two additional 5 mm trocars were placed in the right upper quadrant in a similar fashion. We then examined our umbilical Optiview entry site again found some adhesions of omentum present. These were taken down.  There is no obvious injury to the underlying bowel. The gallbladder was identified and found to be distended and inflamed with adhesions of omentum present. The adhesions were gently stripped off the gallbladder. The gallbladder fundus was grasped and retracted into the right upper quadrant and the infundibulum retracted laterally. The peritoneum over the infundibulum and cystic structures was then gently stripped until the cystic duct and artery were identified and the critical view was achieved.The cystic duct and artery were doubly clipped proximally and once distally and then divided. The gallbladder was then dissected off the gallbladder fossa using electrocautery. There is significant edema surrounding the gallbladder. Once this was completed, it was placed into an EndoCatch bag and was attempted to be removed through the subxiphoid port. However the bag tore the the gallbladder had to be removed with Ethel clamp.  We assured that the are was no residual pieces of plastic from the bag present in the incision or the abdomen. The wound was then copiously irrigated with sterile saline. The right upper quadrant was then irrigated and then suctioned. The dissection field was inspected for hemostasis, bile leak and to confirmed clips were in place. Additional local anesthetic was injected around the port sites under direct visualization.  The ports were removed and the abdomen desulfated.  The 0-Vicryl fascia stitch was placed at the subxiphoid incision then 3-0 Vicryls were placed interrupted fashion in the dermis. Additional  local anesthetic was injected and all the skin incisions were closed using 4-0 Vicryl subcuticular stitch. Dermabond was then applied. The patient was awakened from general anesthesia without complication and returned to the Postoperative Recovery Unit in stable condition. At the end of the case, sponge, instrument and needle counts were correct on 2 occasions. I was present and scrubbed throughout the entirety of the case.

## 2018-12-26 NOTE — DISCHARGE INSTRUCTIONS
Instrucciones de latoya para la colecistectomía laparoscópica  Usted se ha realizado un procedimiento conocido alma colecistectomía laparoscópica. Flynn colecistectomía laparoscópica es un procedimiento para extirpar la vesícula biliar. Las personas que se hacen tye procedimiento por lo regular se recuperan más rápido y experimentan menos dolor que con la operación de cirugía abierta de la vesícula (llamada colecistectomía abierta). Muchos cirujanos recomiendan seguir flynn dieta con bajo contenido de grasas, evitando especialmente los alimentos fritos, jesus el primer mes después de la cirugía.  Usted puede llevar flynn migel plena y tu sin lewis vesícula biliar. [place] incluye comer los alimentos y hacer las cosas que usted disfrutaba antes que empezaran david problemas de la vesícula.  Cuidados en la casa  Estas son algunas recomendaciones para cuidados en la casa:  · Pídale a alguien que lo lleve a david citas con el médico jesus los 3 días siguientes. No conduzca hasta que ya no esté tomando calmantes para el dolor y sea capaz de apretar el pedal del freno sin dudarlo.  · Lávese cuidadosamente la piel cercana a la incisión todos los días con un jabón suave y agua. Puede ducharse un día después de lewis operación.  · Siga lewis dieta normal. Es recomendable no comer comida grasosa, pesada o muy condimentada jesus unos días.  · Recuerde que lleva por lo menos flynn semana para que usted recupere la mayor parte de lewis fuerza y energía.  · Visite a lewis médico si los siguientes síntomas no desaparecen en la semana siguiente a la operación:  ¨ Fatiga  ¨ Dolor en la raúl de la incisión  ¨ Diarrea o estreñimiento  ¨ Falta de apetito     Cuándo llamar al proveedor de atención médica  Llame al proveedor de atención médica inmediatamente si presenta cualquiera de los siguientes síntomas:  · Color amarillento en la piel o los ojos (ictericia)  · Escalofríos  · Fiebre de más de 100.4ºF (38.0°C) o más latoya, o alma le indique el proveedor de  atención médica  · Enrojecimiento, inflamación, dolor en aumento, supuración u olor fétido de la incisión  · Orina oscura o de color óxido  · Heces de color de arcilla o de coloración aixa en lugar de color café  · Dolor de estómago en aumento  · Sangrado rectal  · Hinchazón de la pierna o falta de aire   Date Last Reviewed: 3/25/2014  © 8188-6608 [employer]. [address] [place] [id number]. Todos los derechos reservados. Esta información no pretende sustituir la atención médica profesional. Sólo lewis médico puede diagnosticar y tratar un problema de jerry.

## 2018-12-26 NOTE — ANESTHESIA POSTPROCEDURE EVALUATION
"Anesthesia Post Evaluation    Patient: Bharti Fofana    Procedure(s) Performed: Procedure(s) (LRB):  CHOLECYSTECTOMY, LAPAROSCOPIC, WITH CHOLANGIOGRAM (N/A)    Final Anesthesia Type: general  Patient location during evaluation: PACU  Level of consciousness: awake  Post-procedure vital signs: reviewed and stable  Pain management: adequate  Airway patency: patent    Anesthetic complications: no      Cardiovascular status: stable  Respiratory status: spontaneous ventilation  Hydration status: euvolemic  Follow-up not needed.        Visit Vitals  /72 (BP Location: Right arm, Patient Position: Lying)   Pulse 88   Temp 36.9 °C (98.4 °F) (Oral)   Resp 20   Ht 5' 2" (1.575 m)   Wt 78.5 kg (173 lb 1 oz)   LMP 07/01/2018   SpO2 100%   Breastfeeding? No   BMI 31.65 kg/m²       Pain/Nando Score: Pain Rating Prior to Med Admin: 0 (12/26/2018  5:43 AM)  Pain Rating Post Med Admin: 0 (12/26/2018  5:43 AM)  Nando Score: 10 (12/26/2018 12:03 PM)        "

## 2018-12-26 NOTE — NURSING
D/c instructions and medications reviewed with pt via telecommunication. Pt verified receiving prescribed Percocet from bedside pharmacy. Pt and spouse at bedside verbalized understanding of information. Bedside RN notified.

## 2018-12-26 NOTE — DISCHARGE SUMMARY
[facility]  General Surgery  Discharge Summary      Patient Name: Bharti Fofana  MRN: [id number]  Admission Date: 12/25/2018  Hospital Length of Stay: 1 days  Discharge Date and Time:  12/26/2018 3:35 PM  Attending Physician: Justin Black MD   Discharging Provider: Kenneth Sahu Jr, MD  Primary Care Provider: Kev Ding MD     HPI:  48-year-old female who developed severe right upper quadrant pain the day prior to presentation.  She went to [facility] wound ultrasound showed gallstone at the neck of the infundibulum with associated distention of the gallbladder positive sonographic Hilario sign.  Due to severe pain patient is transferred to [facility] for evaluation by surgery    Procedure(s) (LRB):  CHOLECYSTECTOMY, LAPAROSCOPIC, WITH CHOLANGIOGRAM (N/A)     Hospital Course:  Patient had normal labs and no obvious gallbladder wall thickness or edema however given her pain and distention of the gallbladder we felt it is prudent to remove the gallbladder as soon as possible.  She underwent laparoscopic cholecystectomy on 12/26/2018.  The surgery was uneventful but she is found to have hydrops gallbladder with surrounding edema consistent with acute on chronic cholecystitis.  Postop early the patient was returned to her floor room. She tolerated clear liquid diet her pain was well controlled.  Patient stated she was comfortable being discharged home.    Consults:     Significant Diagnostic Studies: Labs:   CMP   Recent Labs   Lab 12/25/18  0531      K 3.8      CO2 24   *   BUN 14   CREATININE 0.63   CALCIUM 8.6*   PROT 7.6   ALBUMIN 4.1   BILITOT 0.3   ALKPHOS 78   AST 16   ALT 15   ANIONGAP 8   ESTGFRAFRICA >60.0   EGFRNONAA >60.0    and CBC   Recent Labs   Lab 12/25/18  0531   WBC 6.65   HGB 12.0   HCT 37.6          Pending Diagnostic Studies:     None        Final Active Diagnoses:    Diagnosis Date Noted POA    PRINCIPAL PROBLEM:  Acute  hydrops of gallbladder  [K82.1] 12/25/2018 Yes    Right upper quadrant abdominal pain [R10.11] 12/25/2018 Yes    Vaginal candidiasis [B37.3] 12/25/2018 Yes    Hypothyroidism [E03.9] 12/25/2018 Yes    Hyperlipidemia [E78.5] 12/25/2018 Yes      Problems Resolved During this Admission:      Discharged Condition: good    Disposition: Home or Self Care    Follow Up:    Patient Instructions:      Diet Adult Regular     Lifting restrictions   Order Comments: No heavy lifting greater than 20 lb, no straining, no strenuous activity for 4 weeks     Notify your health care provider if you experience any of the following:  temperature >100.4     Notify your health care provider if you experience any of the following:  persistent nausea and vomiting or diarrhea     Notify your health care provider if you experience any of the following:  severe uncontrolled pain     Notify your health care provider if you experience any of the following:  redness, tenderness, or signs of infection (pain, swelling, redness, odor or green/yellow discharge around incision site)     Notify your health care provider if you experience any of the following:  severe persistent headache     Notify your health care provider if you experience any of the following:  difficulty breathing or increased cough     Notify your health care provider if you experience any of the following:  worsening rash     Notify your health care provider if you experience any of the following:  persistent dizziness, light-headedness, or visual disturbances     Notify your health care provider if you experience any of the following:  increased confusion or weakness     No dressing needed   Order Comments: - A surgical glue has been placed over your incisions. Please leave the glue in place and do not attempt to remove it.   - It is ok to shower using mild soap and water over the incisions the day after your procedure. Pat dry your incisions. Do not soak in a bath tub or other  body of water for 2 weeks or until cleared by your surgeon.   - If you noticed redness, swelling, fever, increasing pain or significant drainage from your wound please call the office or the hospital  after hours.     Activity as tolerated     Shower on day dressing removed (No bath)     Medications:  Reconciled Home Medications:      Medication List      START taking these medications    miconazole 2 % vaginal cream  Commonly known as:  MICOTIN  Place 1 applicator vaginally every evening.     oxyCODONE-acetaminophen 5-325 mg per tablet  Commonly known as:  PERCOCET  [place] flynn tableta por vía oral cada 6 horas según sea necesario para el dolor.  (Take 1 tablet by mouth every 6 (six) hours as needed for Pain.)        CONTINUE taking these medications    atorvastatin 20 MG tablet  Commonly known as:  LIPITOR     levothyroxine 50 MCG tablet  Commonly known as:  SYNTHROID            Kenneth Sahu Jr, MD  General Surgery  [facility]

## 2018-12-26 NOTE — ANESTHESIA PREPROCEDURE EVALUATION
12/26/2018  Bharti Fofana is a 48 y.o., female admited w acute cholecystitis; for lap roman.    PRIOR ANES (in Epic) [id number]  None    ANES-RELATED MED/SURG  Patient Active Problem List   Diagnosis    Right upper quadrant abdominal pain    Vaginal candidiasis    Biliary colic symptom    Hypothyroidism    Hyperlipidemia     Past Medical History:   Diagnosis Date    Abnormal Pap smear of cervix     Hyperlipidemia     Hypothyroidism     Pneumonia      Past Surgical History:   Procedure Laterality Date    TUBAL LIGATION       ALLERGIES  Review of patient's allergies indicates:  No Known Allergies    ANES-RELATED MAR [id number]  levothyroxine      Anesthesia Evaluation    I have reviewed the Patient Summary Reports.    I have reviewed the Nursing Notes.   I have reviewed the Medications.     Review of Systems  Anesthesia Hx:  No problems with previous Anesthesia   Denies Personal Hx of Anesthesia complications.   Social:  Non-Smoker    Pulmonary:   Pneumonia    Endocrine:   Hypothyroidism      Wt Readings from Last 1 Encounters:   12/26/18 78.5 kg (173 lb 1 oz)     Temp Readings from Last 1 Encounters:   12/26/18 36.7 °C (98.1 °F) (Oral)     BP Readings from Last 1 Encounters:   12/26/18 133/69     Pulse Readings from Last 1 Encounters:   12/26/18 75     SpO2 Readings from Last 1 Encounters:   12/26/18 99%       Physical Exam  General:  Well nourished    Airway/Jaw/Neck:  Airway Findings: Mouth Opening: Normal Tongue: Normal  General Airway Assessment: Adult  Mallampati: III  Improves to II with phonation.  TM Distance: Normal, at least 6 cm  Jaw/Neck Findings:  Neck ROM: Normal ROM       Chest/Lungs:  Chest/Lungs Findings: Clear to auscultation, Normal Respiratory Rate     Heart/Vascular:  Heart Findings: Rate: Normal  Rhythm: Regular Rhythm  Sounds: Normal        Mental Status:  Mental Status Findings:   Alert and Oriented        Lab Results   Component Value Date    WBC 6.65 12/25/2018    HGB 12.0 12/25/2018    HCT 37.6 12/25/2018    MCV 80 (L) 12/25/2018     12/25/2018        Chemistry        Component Value Date/Time     12/25/2018 0531    K 3.8 12/25/2018 0531     12/25/2018 0531    CO2 24 12/25/2018 0531    BUN 14 12/25/2018 0531    CREATININE 0.63 12/25/2018 0531     (H) 12/25/2018 0531        Component Value Date/Time    CALCIUM 8.6 (L) 12/25/2018 0531    ALKPHOS 78 12/25/2018 0531    AST 16 12/25/2018 0531    ALT 15 12/25/2018 0531    BILITOT 0.3 12/25/2018 0531    ESTGFRAFRICA >60.0 12/25/2018 0531    EGFRNONAA >60.0 12/25/2018 0531          Lab Results   Component Value Date    ALBUMIN 4.1 12/25/2018      Lab Results   Component Value Date    TSH 3.791 08/27/2018      Lab Results   Component Value Date    APTT 26.9 12/25/2018      Lab Results   Component Value Date    INR 1.0 12/25/2018     UPT [id number]  negative    CXR      EKG    Anesthesia Plan  Type of Anesthesia, risks & benefits discussed:  Anesthesia Type:  general  Patient's Preference:   Intra-op Monitoring Plan:   Intra-op Monitoring Plan Comments:   Post Op Pain Control Plan:   Post Op Pain Control Plan Comments:   Induction:   IV  Beta Blocker:         Informed Consent: Patient understands risks and agrees with Anesthesia plan.  Questions answered. Anesthesia consent signed with patient.  ASA Score: 2     Day of Surgery Review of History & Physical: I have interviewed and examined the patient. I have reviewed the patient's H&P dated:            Ready For Surgery From Anesthesia Perspective.

## 2019-01-03 ENCOUNTER — OFFICE VISIT (OUTPATIENT)
Dept: SURGERY | Facility: CLINIC | Age: 49
End: 2019-01-03
Payer: MEDICAID

## 2019-01-03 VITALS
WEIGHT: 173.94 LBS | BODY MASS INDEX: 32.01 KG/M2 | HEART RATE: 80 BPM | SYSTOLIC BLOOD PRESSURE: 118 MMHG | DIASTOLIC BLOOD PRESSURE: 73 MMHG | HEIGHT: 62 IN | TEMPERATURE: 98 F

## 2019-01-03 DIAGNOSIS — Z90.49 STATUS POST LAPAROSCOPIC CHOLECYSTECTOMY: Primary | ICD-10-CM

## 2019-01-03 PROCEDURE — 99999 PR PBB SHADOW E&M-EST. PATIENT-LVL III: ICD-10-PCS | Mod: PBBFAC,,, | Performed by: SURGERY

## 2019-01-03 PROCEDURE — 99213 OFFICE O/P EST LOW 20 MIN: CPT | Mod: PBBFAC,PO | Performed by: SURGERY

## 2019-01-03 PROCEDURE — 99999 PR PBB SHADOW E&M-EST. PATIENT-LVL III: CPT | Mod: PBBFAC,,, | Performed by: SURGERY

## 2019-01-03 PROCEDURE — 99024 PR POST-OP FOLLOW-UP VISIT: ICD-10-PCS | Mod: ,,, | Performed by: SURGERY

## 2019-01-03 PROCEDURE — 99024 POSTOP FOLLOW-UP VISIT: CPT | Mod: ,,, | Performed by: SURGERY

## 2019-01-04 NOTE — PROGRESS NOTES
[facility]  [facility]    HPI: Bharti Fofana is a 48 y.o. female status post laparoscopic cholecystectomy on 12/26/2018 here for scheduled follow-up.     was utilized during this visit    Patient reports she is doing well.  Denies fevers, chills, nausea, vomiting.  Tolerating regular diet.  Had some constipation at the beginning but is now having regular bowel function.  Her pain is minimal and she did not take any of the narcotic pain medications.      VITALS:  Vitals:    01/03/19 1432   BP: 118/73   Pulse: 80   Temp: 98.1 °F (36.7 °C)       PHYSICAL EXAM:  GEN:  No acute distress, alert or x3  HEENT:  Anicteric sclera  CV:  Regular rate rhythm  RESP:  Nonlabored breathing  ABD:  Soft, nontender, nondistended.  Laparoscopic port sites are healing well with surgical glue in place.  There is no evidence of infection or breakdown.  EXT:  No edema    PATHOLOGY:  FINAL PATHOLOGIC DIAGNOSIS  Gallbladder, cholecystectomy:  Chronic cholecystitis.  Cholelithiasis.      ASSESSMENT & PLAN:  48 y.o. female s/p laparoscopic cholecystectomy  - no heavy lifting for 6 weeks total  - pathology benign  - return to clinic.

## 2019-03-07 ENCOUNTER — PROCEDURE VISIT (OUTPATIENT)
Dept: OBSTETRICS AND GYNECOLOGY | Facility: CLINIC | Age: 49
End: 2019-03-07
Payer: MEDICAID

## 2019-03-07 DIAGNOSIS — N93.9 ABNORMAL UTERINE BLEEDING (AUB): ICD-10-CM

## 2019-03-07 PROCEDURE — 76830 TRANSVAGINAL US NON-OB: CPT | Mod: PBBFAC,PO

## 2019-03-07 PROCEDURE — 76830 PR  ECHOGRAPHY,TRANSVAGINAL: ICD-10-PCS | Mod: 26,S$PBB,, | Performed by: OBSTETRICS & GYNECOLOGY

## 2019-03-07 PROCEDURE — 76830 TRANSVAGINAL US NON-OB: CPT | Mod: 26,S$PBB,, | Performed by: OBSTETRICS & GYNECOLOGY

## 2019-03-14 ENCOUNTER — PROCEDURE VISIT (OUTPATIENT)
Dept: OBSTETRICS AND GYNECOLOGY | Facility: CLINIC | Age: 49
End: 2019-03-14
Payer: MEDICAID

## 2019-03-14 VITALS
HEIGHT: 62 IN | SYSTOLIC BLOOD PRESSURE: 115 MMHG | BODY MASS INDEX: 32.82 KG/M2 | DIASTOLIC BLOOD PRESSURE: 78 MMHG | WEIGHT: 178.38 LBS

## 2019-03-14 DIAGNOSIS — N93.9 ABNORMAL UTERINE BLEEDING (AUB): Primary | ICD-10-CM

## 2019-03-14 DIAGNOSIS — N89.8 VAGINAL ITCHING: ICD-10-CM

## 2019-03-14 PROCEDURE — 87480 CANDIDA DNA DIR PROBE: CPT

## 2019-03-14 PROCEDURE — 88305 TISSUE EXAM BY PATHOLOGIST: CPT | Performed by: PATHOLOGY

## 2019-03-14 PROCEDURE — 88305 TISSUE SPECIMEN TO PATHOLOGY, OBSTETRICS/GYNECOLOGY: ICD-10-PCS | Mod: 26,,, | Performed by: PATHOLOGY

## 2019-03-14 PROCEDURE — 88305 TISSUE EXAM BY PATHOLOGIST: CPT | Mod: 26,,, | Performed by: PATHOLOGY

## 2019-03-14 PROCEDURE — 87510 GARDNER VAG DNA DIR PROBE: CPT

## 2019-03-14 RX ORDER — TRIAMCINOLONE ACETONIDE 1 MG/G
CREAM TOPICAL 2 TIMES DAILY
Qty: 30 G | Refills: 0 | Status: SHIPPED | OUTPATIENT
Start: 2019-03-14

## 2019-03-14 RX ORDER — NYSTATIN 100000 U/G
CREAM TOPICAL 2 TIMES DAILY
Qty: 30 G | Refills: 0 | Status: SHIPPED | OUTPATIENT
Start: 2019-03-14

## 2019-03-14 NOTE — PROCEDURES
Procedures     Date:3/14/2019  Time:4:58 PM  Name of the procedure: Endometrial Biopsy  Indications: Bharti Fofana is a 48 y.o. female  who presents today for endometrial biopsy secondary to AUB.  Patient's last menstrual period was 02/15/2019..    Patient consent: Risks/benefits of the procedure were discussed with the patient. Patient's questions were answered.  Consents signed.   TIME OUT completed.  Labs:   Office urine pregnancy test negative; Pap NILM  Procedure: Speculum placed in vagina;  cervix swabbed with betadine x 2;  Utreus sounded to 10 cm.   Endometrial pipelle advanced without difficulty x 3 passes and adequate tissue obtained.  Hemostasis achieved.    Complications: None  EBL: 3  cc  Disposition: Pt tolerated the procedure well.    A/P:   -Patient instructed to contact MD or report to emergency room for fever greater than 100.4F, vaginal bleeding greater than 2 pads/hour, severe abdominal pain not relieved with NSAIDs.    - she reports her periods have seem to have gone back to normal, US was normal. Will monitor and likely getting closer to menopause will call with EMB results   - c/o external vaginal itching, myoclog sent in but no abnormalities noted

## 2019-03-18 LAB
BACTERIAL VAGINOSIS DNA: NEGATIVE
CANDIDA GLABRATA DNA: NEGATIVE
CANDIDA KRUSEI DNA: NEGATIVE
CANDIDA RRNA VAG QL PROBE: NEGATIVE
T VAGINALIS RRNA GENITAL QL PROBE: NEGATIVE

## 2019-03-23 NOTE — PROGRESS NOTES
Please let her know that the biopsy was normal. If her periods are still normal then we can continue to monitor them but she should let us know if they get heavy or irregular again.

## 2019-03-26 ENCOUNTER — TELEPHONE (OUTPATIENT)
Dept: OBSTETRICS AND GYNECOLOGY | Facility: CLINIC | Age: 49
End: 2019-03-26

## 2019-03-26 NOTE — TELEPHONE ENCOUNTER
Called patient twice NO answer, unable to leave a voice message (voicemail is not set up) will call later.    Notes recorded by Cailin Omalley MD on 3/23/2019 at 5:05 PM CDT  Please let her know that the biopsy was normal. If her periods are still normal then we can continue to monitor them but she should let us know if they get heavy or irregular again.

## 2019-03-27 ENCOUNTER — TELEPHONE (OUTPATIENT)
Dept: OBSTETRICS AND GYNECOLOGY | Facility: CLINIC | Age: 49
End: 2019-03-27

## 2019-03-27 NOTE — TELEPHONE ENCOUNTER
----- Message from Ashleigh Cao sent at 3/27/2019 10:21 AM CDT -----  Contact: Self/ [phone]  Japanese Only  Type:  Patient Returning Call    Who Called:Patient  Who Left Message for Patient:Lesly  Does the patient know what this is regarding?:No  Would the patient rather a call back or a response via [facility]? Callback  Best Call Back Number:[phone]  Additional Information:

## 2019-03-27 NOTE — TELEPHONE ENCOUNTER
Returned patient phone call, patient NO answer unable to leave a voice message (voicemail is not set up)

## 2020-10-12 ENCOUNTER — CLINICAL SUPPORT (OUTPATIENT)
Dept: URGENT CARE | Facility: CLINIC | Age: 50
End: 2020-10-12
Payer: MEDICAID

## 2020-10-12 DIAGNOSIS — Z78.9 NO KNOWN PROBLEMS: Primary | ICD-10-CM

## 2020-10-12 LAB
CTP QC/QA: YES
SARS-COV-2 RDRP RESP QL NAA+PROBE: NEGATIVE

## 2020-10-12 PROCEDURE — U0002 COVID-19 LAB TEST NON-CDC: HCPCS | Mod: QW,S$GLB,, | Performed by: FAMILY MEDICINE

## 2020-10-12 PROCEDURE — U0002: ICD-10-PCS | Mod: QW,S$GLB,, | Performed by: FAMILY MEDICINE

## 2021-03-29 ENCOUNTER — IMMUNIZATION (OUTPATIENT)
Dept: PRIMARY CARE CLINIC | Facility: CLINIC | Age: 51
End: 2021-03-29
Payer: MEDICAID

## 2021-03-29 DIAGNOSIS — Z23 NEED FOR VACCINATION: Primary | ICD-10-CM

## 2021-03-29 PROCEDURE — 91301 PR SARS-COV-2 COVID-19 VACCINE, NO PRSV, 100MCG/0.5ML, IM: ICD-10-PCS | Mod: S$GLB,,, | Performed by: INTERNAL MEDICINE

## 2021-03-29 PROCEDURE — 0011A PR IMMUNIZ ADMIN, SARS-COV-2 COVID-19 VACC, 100MCG/0.5ML, 1ST DOSE: ICD-10-PCS | Mod: CV19,S$GLB,, | Performed by: INTERNAL MEDICINE

## 2021-03-29 PROCEDURE — 91301 PR SARS-COV-2 COVID-19 VACCINE, NO PRSV, 100MCG/0.5ML, IM: CPT | Mod: S$GLB,,, | Performed by: INTERNAL MEDICINE

## 2021-03-29 PROCEDURE — 0011A PR IMMUNIZ ADMIN, SARS-COV-2 COVID-19 VACC, 100MCG/0.5ML, 1ST DOSE: CPT | Mod: CV19,S$GLB,, | Performed by: INTERNAL MEDICINE

## 2021-03-29 RX ADMIN — Medication 0.5 ML: at 11:03

## 2021-04-28 ENCOUNTER — IMMUNIZATION (OUTPATIENT)
Dept: PRIMARY CARE CLINIC | Facility: CLINIC | Age: 51
End: 2021-04-28
Payer: MEDICAID

## 2021-04-28 DIAGNOSIS — Z23 NEED FOR VACCINATION: Primary | ICD-10-CM

## 2021-04-28 PROCEDURE — 0012A PR IMMUNIZ ADMIN, SARS-COV-2 COVID-19 VACC, 100MCG/0.5ML, 2ND DOSE: ICD-10-PCS | Mod: CV19,S$GLB,, | Performed by: INTERNAL MEDICINE

## 2021-04-28 PROCEDURE — 91301 PR SARS-COV-2 COVID-19 VACCINE, NO PRSV, 100MCG/0.5ML, IM: ICD-10-PCS | Mod: S$GLB,,, | Performed by: INTERNAL MEDICINE

## 2021-04-28 PROCEDURE — 0012A PR IMMUNIZ ADMIN, SARS-COV-2 COVID-19 VACC, 100MCG/0.5ML, 2ND DOSE: CPT | Mod: CV19,S$GLB,, | Performed by: INTERNAL MEDICINE

## 2021-04-28 PROCEDURE — 91301 PR SARS-COV-2 COVID-19 VACCINE, NO PRSV, 100MCG/0.5ML, IM: CPT | Mod: S$GLB,,, | Performed by: INTERNAL MEDICINE

## 2021-04-28 RX ADMIN — Medication 0.5 ML: at 07:04

## 2024-11-29 NOTE — PLAN OF CARE
[facility]   Physical Therapy Initial Evaluation      Name: Bharti Fofana  [facility] Number: [id number]    Therapy Diagnosis:   Encounter Diagnoses   Name Primary?    Right leg pain Yes    Pain of right heel     Decreased range of motion of both ankles     Decreased range of motion of both hips         Physician: Davide Del Rosario, NP    Physician Orders: PT Eval and Treat   Medical Diagnosis from Referral: Lumbago with sciatica, unspecified side [M54.40]   Evaluation Date: 12/2/2024  Authorization Period Expiration: 12/31/2024  Plan of Care Expiration: 1/17/2025  Progress Note Due: 1/2/2025  Visit # / Visits authorized: 1/1   FOTO: 1/3    Precautions: Standard    Time In: 0810  Time Out: 0900  Total Billable Time: 50 minutes    Subjective     Date of onset: 2 years ago    History of current condition - Bharti reports: insidious onset of right buttock, posterior thigh and lower leg, dorsal mid foot, and plantar medial heel pain that worsens from standing and walking for >1 hour. Patient denies pain being worse upon weightbearing in the morning or after periods of rest. Patient denies previous history of this pain. Patient denies right lower extremity numbness or tingling, incontinence, weakness or changes in gait, saddle/perineal paresthesia, pain unchanged with rest, or unexplainable weight loss.    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 Patient denies pain while performing  Occupation: Patient cleans homes 3 times a week, 8 hours a day. Patient reports her pain increases from this  Prior Level of Function: Independent  Current Level of Function: Limited ability to stand and walk for >1 hour    Pain:  Current 0/10, worst 10/10, best 0/10   Location: Left lower extremity, sole of foot/heel  Description: Sore/spasm  Aggravating Factors: Standing > 1 hours, walking > 1 hour  Easing Factors: Sitting,  dancing    Patients goals: Eliminate pain     Medical History:   Past Medical History:   Diagnosis Date    Abnormal Pap smear of cervix     Hyperlipidemia     Hypothyroidism     Pneumonia      Surgical History:   Bharti Fofana  has a past surgical history that includes Tubal ligation and Laparoscopic cholecystectomy (N/A, 12/26/2018).    Medications:   Bharti has a current medication list which includes the following prescription(s): atorvastatin, levothyroxine, nystatin, and triamcinolone acetonide 0.1%.    Allergies:   Review of patient's allergies indicates:  No Known Allergies     Objective      WNL=within normal limits  WFL=within functional limits  NT=not tested  !=pain    Posture: Standing: Bilateral genu valgus and toe out  Palpation: Pain to right sacral border at level of S2 and coccyx. Tenderness to right quadratus plantae  Sensation: Normal light touch sensation to bilateral L1-S2 dermatomes    Range of motion:   Lumbar Active  Comments   Flexion  (40-50) WNL    Extension  (20) WNL    Right side bending  (20) WNL    Left side bending  (20) WNL    Right rotation (5-10) >Left     Left rotation  (5-10) <Right       Hip Right Left Comments    Active  Passive  Active   Passive    Flexion WNL NT WNL NT    Extension WNL NT WNL NT    Abduction WNL NT WNL NT    Adduction WNL NT WNL NT    Internal rotation 20 24 30 35    External rotation WNL NT WNL NT       Knee Right Left Comments    Active Passive Active Passive     Flexion WNL NT WNL NT    Extension WNL NT WNL NT       Ankle Right Left Comments    Active  Passive Active Passive    Dorsiflexion 3 6 4 8    Plantarflexion WNL NT WNL NT    Inversion WNL NT WNL NT    Eversion WNL NT WNL NT      Lower extremity manual muscle tests  Right Left Comments   Hip flexion 4/5 4/5    Hip extension 4/5 4/5    Hip abduction 4/5 4/5    Hip adduction 4/5 4/5    Hip internal rotation 4+/5 4+/5    Hip external rotation 4/5 4/5    Knee flexion 5/5 5/5    Knee extension 5/5 5/5   "  Ankle dorsiflexion 5/5 4+/5    Ankle plantarflexion 5/5 5/5    Ankle inversion 5/5 5/5    Ankle eversion 5/5 5/5      Joint mobility:   Thoracic: Mild hypomobility. No pain  Lumbar: Mild hypomobility. No pain  SI:  No pain  Talocrural: Hypomobile bilateral   Subtalar: Normal bilateral     Gross movement:    Gait: Decreased bilateral hip and knee flexion in swing. Decreased bilateral dorsiflexion at heel strike    Flexibility:   Hamstring: Mild impairment bilateral    Special tests Right Left   Slump + -   Passive straight leg raise  + -   Active straight leg raise  - -   Quadrant  - -     Intake Outcome Measure for FOTO Lumbar Spine Survey    Therapist reviewed FOTO scores for Bharti Fofana on 12/2/2024.   FOTO report - see Media section or FOTO account episode details.    Intake Score: 65%     Treatment     Total Treatment time (time-based codes) separate from Evaluation: 10 minutes     Bharti received the treatments listed below:      therapeutic exercises to develop ROM and flexibility for 10 minutes including:    Supine sciatic nerve glide (knee ext + ankle dorsiflexion): 3x10 right  Seated hip internal rotation with adduction into towel roll: 5"x10 double leg   Lunge gastrocnemius stretch: 2x30" right     Patient Education and Home Exercises     Education provided:   - Findings; prognosis and plan of care  - Home exercise program    Written Home Exercises Provided: Yes. Exercises were reviewed and Bharti was able to demonstrate them prior to the end of the session.  Bharti demonstrated good  understanding of the education provided. See EMR under Patient Instructions for exercises provided during therapy sessions.    Assessment     Bharti is a 54 y.o. female referred to outpatient Physical Therapy with a medical diagnosis of lumbago with sciatica. Patient presents with right lower extremity pain that seems to be a mix of neural tension and local dysfunction near ankle. Neural tension positive in addition to " sacral tenderness. Lumbar mobilizations or range of motion without local or radicular symptoms. Ankle mobility and gastrocnemius flexibility impaired and tenderness isolated to quadratus plantae; possible windlass mechanism and/or mid-tarsal compensations for hypomobility.     Patient prognosis is Good.   Patient will benefit from skilled outpatient Physical Therapy to address the deficits stated above and in the chart below, provide patient /family education, and to maximize patientt's level of independence.     Plan of care discussed with patient: Yes  Patient's spiritual, cultural and educational needs considered and patient is agreeable to the plan of care and goals as stated below:     Anticipated Barriers for therapy: None    Medical Necessity is demonstrated by the following  History  Co-morbidities and personal factors that may impact the plan of care [] LOW: no personal factors / co-morbidities  [x] MODERATE: 1-2 personal factors / co-morbidities  [x] HIGH: 3+ personal factors / co-morbidities    Moderate / High Support Documentation:   Co-morbidities affecting plan of care: body mass index > 30, hyperlipidemia, hypothyroidism    Personal Factors:   no deficits     Examination  Body Structures and Functions, activity limitations and participation restrictions that may impact the plan of care [] LOW: addressing 1-2 elements  [x] MODERATE: 3+ elements  [] HIGH: 4+ elements (please support below)    Moderate / High Support Documentation: Above     Clinical Presentation [] LOW: stable  [x] MODERATE: Evolving  [] HIGH: Unstable     Decision Making/ Complexity Score: moderate       Short Term Goals (3 Weeks):  1. Patient will be compliant with home exercise program to supplement therapy in promoting functional mobility. Progressing, not met   2. Patient will perform posterior sling pull on step with good control to demonstrate improved posterior chain coordination. Progressing, not met   3. Patient will improve  ankle dorsiflexion to 10 degrees bilateral to promote normal gait mechanics. Progressing, not met Progressing, not met   4. Patient will improve hip internal rotation to 40 degrees bilateral to reduce lumbar loading while performing closed chain activities. Progressing, not met   Long Term Goals (6 Weeks):   1. Patient will improve FOTO score to >/= 69% to decrease perceived limitation with maintaining/changing body position. Progressing, not met   2. Patient will have negative neural tension testing to demonstrate healing of right lower extremity. Progressing, not met   3. Patient will improve impaired lower extremity manual muscle tests to >/=4+/5 to improve strength for functional tasks. Progressing, not met   4. Patient will report no pain while standing or walking >1 hour for 2 consecutive visits to promote work related function. Progressing, not met     Plan     Plan of care Certification: 12/2/2024 to 1/17/2025.    Outpatient Physical Therapy 2 times weekly for 6 weeks to include the following interventions: Cervical/Lumbar Traction, Electrical Stimulation -, Gait Training, Manual Therapy, Moist Heat/ Ice, Neuromuscular Re-ed, Patient Education, Self Care, Therapeutic Activities, and Therapeutic Exercise.     Janie Bonner, PT, DPT, OCS        Physician's Signature: _________________________________________ Date: ________________

## 2024-12-02 ENCOUNTER — CLINICAL SUPPORT (OUTPATIENT)
Dept: REHABILITATION | Facility: HOSPITAL | Age: 54
End: 2024-12-02
Payer: MEDICAID

## 2024-12-02 DIAGNOSIS — M25.672 DECREASED RANGE OF MOTION OF BOTH ANKLES: ICD-10-CM

## 2024-12-02 DIAGNOSIS — M25.651 DECREASED RANGE OF MOTION OF BOTH HIPS: ICD-10-CM

## 2024-12-02 DIAGNOSIS — M79.604 RIGHT LEG PAIN: Primary | ICD-10-CM

## 2024-12-02 DIAGNOSIS — M79.671 PAIN OF RIGHT HEEL: ICD-10-CM

## 2024-12-02 DIAGNOSIS — M25.671 DECREASED RANGE OF MOTION OF BOTH ANKLES: ICD-10-CM

## 2024-12-02 DIAGNOSIS — M25.652 DECREASED RANGE OF MOTION OF BOTH HIPS: ICD-10-CM

## 2024-12-02 PROCEDURE — 97162 PT EVAL MOD COMPLEX 30 MIN: CPT | Mod: PN

## 2024-12-02 PROCEDURE — 97110 THERAPEUTIC EXERCISES: CPT | Mod: PN

## 2024-12-09 ENCOUNTER — TELEPHONE (OUTPATIENT)
Dept: REHABILITATION | Facility: HOSPITAL | Age: 54
End: 2024-12-09
Payer: MEDICAID

## 2024-12-09 PROBLEM — M25.671 DECREASED RANGE OF MOTION OF BOTH ANKLES: Status: ACTIVE | Noted: 2024-12-09

## 2024-12-09 PROBLEM — M79.604 RIGHT LEG PAIN: Status: ACTIVE | Noted: 2024-12-09

## 2024-12-09 PROBLEM — M79.671 PAIN OF RIGHT HEEL: Status: ACTIVE | Noted: 2024-12-09

## 2024-12-09 PROBLEM — M25.651 DECREASED RANGE OF MOTION OF BOTH HIPS: Status: ACTIVE | Noted: 2024-12-09

## 2024-12-09 PROBLEM — M25.672 DECREASED RANGE OF MOTION OF BOTH ANKLES: Status: ACTIVE | Noted: 2024-12-09

## 2024-12-09 PROBLEM — M25.652 DECREASED RANGE OF MOTION OF BOTH HIPS: Status: ACTIVE | Noted: 2024-12-09

## 2025-01-02 ENCOUNTER — TELEPHONE (OUTPATIENT)
Dept: REHABILITATION | Facility: HOSPITAL | Age: 55
End: 2025-01-02
Payer: MEDICAID

## 2025-01-02 NOTE — TELEPHONE ENCOUNTER
Attempted to call patient to inform of her attendance violation and will be removed from schedule at this time. No answer to provided contact number.     Harshil Ham, PTA  1/2/2025

## (undated) DEVICE — IRRIGATOR ENDOSCOPY DISP.

## (undated) DEVICE — SPONGE DERMA 8PLY 2X2

## (undated) DEVICE — SYS CLSR DERMABOND PRINEO 22CM

## (undated) DEVICE — BAG TISS RETRV MONARCH 10MM

## (undated) DEVICE — TEGADERM IV

## (undated) DEVICE — APPLIER CLIP ENDO LIGAMAX 5MM